# Patient Record
Sex: MALE | NOT HISPANIC OR LATINO | URBAN - NONMETROPOLITAN AREA
[De-identification: names, ages, dates, MRNs, and addresses within clinical notes are randomized per-mention and may not be internally consistent; named-entity substitution may affect disease eponyms.]

---

## 2024-02-15 ENCOUNTER — EVALUATION (OUTPATIENT)
Dept: PHYSICAL THERAPY | Facility: CLINIC | Age: 34
End: 2024-02-15
Payer: OTHER MISCELLANEOUS

## 2024-02-15 DIAGNOSIS — S33.5XXD SPRAIN OF LUMBAR REGION, SUBSEQUENT ENCOUNTER: ICD-10-CM

## 2024-02-15 PROCEDURE — 97110 THERAPEUTIC EXERCISES: CPT | Mod: GP | Performed by: PHYSICAL THERAPIST

## 2024-02-15 PROCEDURE — 97161 PT EVAL LOW COMPLEX 20 MIN: CPT | Mod: GP | Performed by: PHYSICAL THERAPIST

## 2024-02-15 ASSESSMENT — PATIENT HEALTH QUESTIONNAIRE - PHQ9
2. FEELING DOWN, DEPRESSED OR HOPELESS: NOT AT ALL
1. LITTLE INTEREST OR PLEASURE IN DOING THINGS: NOT AT ALL
SUM OF ALL RESPONSES TO PHQ9 QUESTIONS 1 AND 2: 0

## 2024-02-15 ASSESSMENT — PAIN SCALES - GENERAL: PAINLEVEL_OUTOF10: 7

## 2024-02-15 ASSESSMENT — PAIN - FUNCTIONAL ASSESSMENT: PAIN_FUNCTIONAL_ASSESSMENT: 0-10

## 2024-02-15 ASSESSMENT — ENCOUNTER SYMPTOMS
DEPRESSION: 0
OCCASIONAL FEELINGS OF UNSTEADINESS: 0
LOSS OF SENSATION IN FEET: 0

## 2024-02-15 NOTE — PROGRESS NOTES
Physical Therapy Evaluation and Treatment      Patient Name: Mekhi Erazo  MRN: [id number]  Today's Date: 2/15/2024  Time Calculation  Start Time: 1505  Stop Time: 1545  Time Calculation (min): 40 min    PT Evaluation Time Entry  PT Evaluation (Low) Time Entry: 29   PT Therapeutic Procedures Time Entry  Therapeutic Exercise Time Entry: 10                         Assessment:  PT Assessment Results: Decreased strength, Decreased range of motion, Decreased mobility, Pain  Rehab Prognosis: Good  Barriers to Participation:  (None)    The pt presents with Medical Dx of  Lumbar Sprain.   Pt presents with the following deficits: increased pain, decreased strength, ROM, flexibility and functional ability to lift/carry objects.  Pt would benefit from PT services in order to improve on these deficits and maximize strength and ability for functional activity/mobility.        Plan:  Treatment/Interventions: Aquatic therapy, Cryotherapy, Dry needling, Education/ Instruction, Electrical stimulation, Hot pack, Manual therapy, Mechanical traction, Therapeutic exercises, Ultrasound (Manual Therapy including IASTM and cupping as needed.)  PT Plan: Skilled PT  PT Frequency:  (2x/wk for 5 weeks or 10 sessions)  Rehab Potential: Good  Plan of Care Agreement: Patient (Patient Goal:  Improve pain and improve strength and get back to work)    Current Problem:   Problem List Items Addressed This Visit             ICD-10-CM       Other    Sprain of lumbar region, subsequent encounter S33.5XXD    Relevant Orders    Follow Up In Physical Therapy       Subjective   Pt reports that he injured his Low back on 11/8/2023 lifting a tote at work.  Pt reports that the pain in the low back mainly but does radiate down his L leg to his knee mostly on the outside of his leg.  Pt reports that the pain is worse with standing activity or lifting objects.  Pt reports that sitting in a comfortable position, medication and heat or ice helps a little with the  pain.      General  Reason for Referral: low back issue  Referred By: Tony Hoover DO  General Comment: Visit # 1/10    Precautions:  Precautions  Precautions Comment: No Fall Risk.   PMH:  No significant PMH.  Precautions/Restrictions:  No lifting, pushing or pulling > 10#    Pain:  Pain Assessment  Pain Assessment: 0-10  Pain Score: 7 (Pain range  5-9/10)  Pain Type: Chronic pain  Pain Location: Back  Pain Orientation: Lower    Home Living:   Pt lives alone  in a 1 story home.      Prior Level of Function:   Pt was I with all ADL's and IADL's prior.  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          Objective   Observation:  FHP with rounded shoulders.      Palpation:  Tender L3-5 levels and R  paraspinals.      Sensation:  Intact to light touch b/l LE's.                                      Myotomes/Strength:      R-   Hip Flex   5/5   Knee flex   5/5   Knee ext   5/5   DF   5/5   PF   5/5        L-   Hip Flex   4/5   Knee flex   4/5   Knee ext   4/5   DF   5/5   PF   5/5                                                           Lumbar AROM:      R-   Flex   Taylor  35%   Ext   Taylor 75%    SB   Taylor   35%       L-     SB   Taylor   35%                                                          Special Tests:  R Slump Test  (+)                           L Slump Test  (-)                      R Piriformis Test  (+)                      L Piriformis Test  (-)    Flexion/Extension Biased:  Pt has pain in flexion and extension.      Outcome Measures:  Other Measures  Oswestry Disablity Index (KAREN): 38%     Treatments:  Ther Ex:   10 min  1 unit  Hooklying SKTC  5 sec hold x 5 reps ea  Hooklying Piriformis/Glut Stretch  20 sec hold x 2 ea  Hooklying Lateral Trunk Rotation  (small comfortable arc of motion)   2 min  HEP instruction with handout given    OP EDUCATION:   PT edu pt regarding PT POC/course of treatment and HEP.  Pt was given exercise handout as a reference.  Pt verbalized good  understanding.  Access Code:  [email or web address]    Goals:  Active       PT Problem       PT Goal 1       Start:  02/15/24    Expected End:  24       LTG's:    1) Improve Lumbar AROM from  Taylor 75% Ext and 35% flex and b/l SB  to Taylor <= 25% in all planes of deficit in order to facilitate improved gait and mobility.       4-6 weeks      2) Improve Core and  L LE strength from 4/5  to >= 5-/5 throughout in order to facilitate safe gait and mobility.         4-6 weeks      3) Improve Modified Oswestry score by >= 5 points in order to improve QOL.     4-6 weeks      4) Improve LBP from  9/10 to <= 3 /10 with activity in order to improve QOL.      4-6 weeks      5) Pt will be able to walk longer distances, lift/carry objects and perform standing activity in the home and on the job without significant limitation       4-6 weeks      ST)  Pt/caregiver will be I and consistent with HEP in order to maximize strength and flexibility.    2-3 weeks

## 2024-02-15 NOTE — Clinical Note
February 15, 2024    Tony Hoover DO    Patient: Mekhi Erazo   YOB: 1990   Date of Visit: 2/15/2024       Dear No referring provider defined for this encounter.    The attached plan of care is being sent to you because your patient’s medical reimbursement requires that you certify the plan of care. Your signature is required to allow uninterrupted insurance coverage.      You may indicate your approval by signing below and faxing this form back to us at Dept Fax: [phone].    Please call Dept: [phone] with any questions or concerns.    Thank you for this referral,        Judd Ratliff, PT  [address] [address]    Payer: Payor: [employer] / Plan: [employer] / Product Type: *No Product type* /                                                                         Date:     Dear Judd Ratliff PT,     Re: Mr. Mekhi Erazo, MRN:[id number]    I certify that I have reviewed the attached plan of care and it is medically necessary for Mr. Mekhi Erazo (1990) who is under my care.          ______________________________________                    _________________  Provider name and credentials                                           Date and time                                                                                           Plan of Care 2/15/24   Effective from: 2/15/2024  Effective to: 4/5/2024    Plan ID: [id number]            Participants as of Finalize on 2/15/2024    Name Type Comments Contact Info    Tony Hoover DO Consulting Physician      Judd Ratliff, PT Physical Therapist  [phone]       Last Plan Note     Author: Judd Ratliff PT Status: Signed Last edited: 2/15/2024  3:00 PM       Physical Therapy Evaluation and Treatment      Patient Name: Mekhi Erazo  MRN: [id number]  Today's Date: 2/15/2024  Time Calculation  Start Time: 1505  Stop Time: 1545  Time  Calculation (min): 40 min    PT Evaluation Time Entry  PT Evaluation (Low) Time Entry: 29   PT Therapeutic Procedures Time Entry  Therapeutic Exercise Time Entry: 10                         Assessment:  PT Assessment Results: Decreased strength, Decreased range of motion, Decreased mobility, Pain  Rehab Prognosis: Good  Barriers to Participation:  (None)    The pt presents with Medical Dx of  Lumbar Sprain.   Pt presents with the following deficits: increased pain, decreased strength, ROM, flexibility and functional ability to lift/carry objects.  Pt would benefit from PT services in order to improve on these deficits and maximize strength and ability for functional activity/mobility.        Plan:  Treatment/Interventions: Aquatic therapy, Cryotherapy, Dry needling, Education/ Instruction, Electrical stimulation, Hot pack, Manual therapy, Mechanical traction, Therapeutic exercises, Ultrasound (Manual Therapy including IASTM and cupping as needed.)  PT Plan: Skilled PT  PT Frequency:  (2x/wk for 5 weeks or 10 sessions)  Rehab Potential: Good  Plan of Care Agreement: Patient (Patient Goal:  Improve pain and improve strength and get back to work)    Current Problem:   Problem List Items Addressed This Visit             ICD-10-CM       Other    Sprain of lumbar region, subsequent encounter S33.5XXD    Relevant Orders    Follow Up In Physical Therapy       Subjective   Pt reports that he injured his Low back on 11/8/2023 lifting a tote at work.  Pt reports that the pain in the low back mainly but does radiate down his L leg to his knee mostly on the outside of his leg.  Pt reports that the pain is worse with standing activity or lifting objects.  Pt reports that sitting in a comfortable position, medication and heat or ice helps a little with the pain.      General  Reason for Referral: low back issue  Referred By: Tony Hoover DO  General Comment: Visit # 1/10    Precautions:  Precautions  Precautions Comment: No Fall  Risk.   PMH:  No significant PMH.  Precautions/Restrictions:  No lifting, pushing or pulling > 10#    Pain:  Pain Assessment  Pain Assessment: 0-10  Pain Score: 7 (Pain range  5-9/10)  Pain Type: Chronic pain  Pain Location: Back  Pain Orientation: Lower    Home Living:   Pt lives alone  in a 1 story home.      Prior Level of Function:   Pt was I with all ADL's and IADL's prior.  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          Objective   Observation:  FHP with rounded shoulders.      Palpation:  Tender L3-5 levels and R  paraspinals.      Sensation:  Intact to light touch b/l LE's.                                      Myotomes/Strength:      R-   Hip Flex   5/5   Knee flex   5/5   Knee ext   5/5   DF   5/5   PF   5/5        L-   Hip Flex   4/5   Knee flex   4/5   Knee ext   4/5   DF   5/5   PF   5/5                                                           Lumbar AROM:      R-   Flex   Taylor  35%   Ext   Taylor 75%    SB   Taylor   35%       L-     SB   Taylor   35%                                                          Special Tests:  R Slump Test  (+)                           L Slump Test  (-)                      R Piriformis Test  (+)                      L Piriformis Test  (-)    Flexion/Extension Biased:  Pt has pain in flexion and extension.      Outcome Measures:  Other Measures  Oswestry Disablity Index (KAREN): 38%     Treatments:  Ther Ex:   10 min  1 unit  Hooklying SKTC  5 sec hold x 5 reps ea  Hooklying Piriformis/Glut Stretch  20 sec hold x 2 ea  Hooklying Lateral Trunk Rotation  (small comfortable arc of motion)   2 min  HEP instruction with handout given    OP EDUCATION:   PT edu pt regarding PT POC/course of treatment and HEP.  Pt was given exercise handout as a reference.  Pt verbalized good understanding.  Access Code:  [email or web address]    Goals:  Active       PT Problem       PT Goal 1       Start:  02/15/24    Expected End:  04/12/24       LTG's:    1) Improve Lumbar AROM from  Taylor 75% Ext  and 35% flex and b/l SB  to Taylor <= 25% in all planes of deficit in order to facilitate improved gait and mobility.       4-6 weeks      2) Improve Core and  L LE strength from 4/5  to >= 5-/5 throughout in order to facilitate safe gait and mobility.         4-6 weeks      3) Improve Modified Oswestry score by >= 5 points in order to improve QOL.     4-6 weeks      4) Improve LBP from  9/10 to <= 3 /10 with activity in order to improve QOL.      4-6 weeks      5) Pt will be able to walk longer distances, lift/carry objects and perform standing activity in the home and on the job without significant limitation       4-6 weeks      ST)  Pt/caregiver will be I and consistent with HEP in order to maximize strength and flexibility.    2-3 weeks                              Current Participants as of 2/15/2024    Name Type Comments Contact Info    Tony Hoover DO Consulting Physician      Signature pending    Judd Ratliff, PT Physical Therapist  [phone]

## 2024-02-19 ENCOUNTER — TREATMENT (OUTPATIENT)
Dept: PHYSICAL THERAPY | Facility: CLINIC | Age: 34
End: 2024-02-19
Payer: OTHER MISCELLANEOUS

## 2024-02-19 DIAGNOSIS — S33.5XXD SPRAIN OF LUMBAR REGION, SUBSEQUENT ENCOUNTER: ICD-10-CM

## 2024-02-19 PROCEDURE — 97010 HOT OR COLD PACKS THERAPY: CPT | Mod: GP | Performed by: PHYSICAL THERAPIST

## 2024-02-19 PROCEDURE — 97110 THERAPEUTIC EXERCISES: CPT | Mod: GP | Performed by: PHYSICAL THERAPIST

## 2024-02-19 PROCEDURE — 97014 ELECTRIC STIMULATION THERAPY: CPT | Mod: GP | Performed by: PHYSICAL THERAPIST

## 2024-02-19 ASSESSMENT — PAIN - FUNCTIONAL ASSESSMENT: PAIN_FUNCTIONAL_ASSESSMENT: 0-10

## 2024-02-19 ASSESSMENT — PAIN SCALES - GENERAL: PAINLEVEL_OUTOF10: 7

## 2024-02-19 NOTE — PROGRESS NOTES
Physical Therapy Treatment    Patient Name: Mekhi Erazo  MRN: [id number]  Today's Date: 2/19/2024  Time Calculation  Start Time: 1315  Stop Time: 1355  Time Calculation (min): 40 min      PT Therapeutic Procedures Time Entry  Therapeutic Exercise Time Entry: 20   PT Modalities Time Entry  E-Stim (Unattended) Time Entry: 10  Hot/Cold Pack Time Entry: 10                     General  Reason for Referral: low back issue  Referred By: Tony Hoover DO  General Comment: Visit # 7/10    Assessment:  PT only had pt perform stretches today due to severe pain and added E-stim with moist heat for pain today.  Pt reports that the heat and e-stim felt good on back but did not take pain away but did lessen it slightly.      Plan:  OP PT Plan  Treatment/Interventions: Aquatic therapy, Cryotherapy, Dry needling, Education/ Instruction, Electrical stimulation, Hot pack, Manual therapy, Mechanical traction, Therapeutic exercises, Ultrasound (Manual Therapy including IASTM and cupping as needed.)  PT Plan: Skilled PT  PT Frequency:  (2x/wk for 5 weeks or 10 sessions)  Rehab Potential: Good  Plan of Care Agreement: Patient (Patient Goal:  Improve pain and improve strength and get back to work)    Continue to progress with low back, hip/LE and core strength and ROM/flexibility in order to be able to work around the home or on the job without severe pain/limitation.    Current Problem  Problem List Items Addressed This Visit             ICD-10-CM       Other    Sprain of lumbar region, subsequent encounter S33.5XXD       Subjective  Pt reports that his Lower back is pretty sore and painful today with standing activity.      Precautions  Precautions  Precautions Comment: No Fall Risk.   PMH:  No significant PMH.  Precautions/Restrictions:  No lifting, pushing or pulling > 10#    Pain  Pain Assessment: 0-10  Pain Score: 7  Pain Type: Chronic pain  Pain Location: Back  Pain Orientation: Lower, Right      Treatments:  Ther Ex:   20 min  1  unit  Hooklying SKTC  5 sec hold x 5 reps ea  Hooklying Piriformis/Glut Stretch  20 sec hold x 2 ea  Hooklying Lateral Trunk Rotation  (small comfortable arc of motion)   2 min    Modalities:  10 min  1 unit  IFC E-stim with moist heat to low back sitting in chair.  10 min      OP EDUCATION:   Access Code:  [email or web address]  Edu on proper form and speed of movement with ex's.  Pt verbalized/demonstrated good understanding.         Goals:  Active       PT Problem       PT Goal 1       Start:  02/15/24    Expected End:  24       LTG's:    1) Improve Lumbar AROM from  Taylor 75% Ext and 35% flex and b/l SB  to Taylor <= 25% in all planes of deficit in order to facilitate improved gait and mobility.       4-6 weeks      2) Improve Core and  L LE strength from 4/5  to >= 5-/5 throughout in order to facilitate safe gait and mobility.         4-6 weeks      3) Improve Modified Oswestry score by >= 5 points in order to improve QOL.     4-6 weeks      4) Improve LBP from  9/10 to <= 3 /10 with activity in order to improve QOL.      4-6 weeks      5) Pt will be able to walk longer distances, lift/carry objects and perform standing activity in the home and on the job without significant limitation       4-6 weeks      ST)  Pt/caregiver will be I and consistent with HEP in order to maximize strength and flexibility.    2-3 weeks

## 2024-02-27 ENCOUNTER — DOCUMENTATION (OUTPATIENT)
Dept: PHYSICAL THERAPY | Facility: CLINIC | Age: 34
End: 2024-02-27
Payer: OTHER MISCELLANEOUS

## 2024-02-27 NOTE — PROGRESS NOTES
Physical Therapy                 Therapy Communication Note    Patient Name: Mekhi Erazo  MRN: [id number]  Today's Date: 2/27/2024     Discipline: Physical Therapy    Missed Visit Reason:      Missed Time: No Show    Comment:  Called patient and he stated that he has been very painful today and could not come into appointment. Gave patient next appointment date and time. ANDREW

## 2024-03-01 ENCOUNTER — TREATMENT (OUTPATIENT)
Dept: PHYSICAL THERAPY | Facility: CLINIC | Age: 34
End: 2024-03-01
Payer: OTHER MISCELLANEOUS

## 2024-03-01 DIAGNOSIS — S33.5XXD SPRAIN OF LUMBAR REGION, SUBSEQUENT ENCOUNTER: ICD-10-CM

## 2024-03-01 PROCEDURE — 97010 HOT OR COLD PACKS THERAPY: CPT | Mod: GP,CQ

## 2024-03-01 PROCEDURE — 97110 THERAPEUTIC EXERCISES: CPT | Mod: GP,CQ

## 2024-03-01 PROCEDURE — 97014 ELECTRIC STIMULATION THERAPY: CPT | Mod: GP,CQ

## 2024-03-01 ASSESSMENT — PAIN - FUNCTIONAL ASSESSMENT: PAIN_FUNCTIONAL_ASSESSMENT: 0-10

## 2024-03-01 ASSESSMENT — PAIN DESCRIPTION - DESCRIPTORS: DESCRIPTORS: SHARP;ACHING;BURNING;DISCOMFORT

## 2024-03-01 ASSESSMENT — PAIN SCALES - GENERAL: PAINLEVEL_OUTOF10: 7

## 2024-03-01 NOTE — PROGRESS NOTES
Physical Therapy Treatment    Patient Name: Mekhi Erazo  MRN: [id number]  Today's Date: 3/1/2024  Time Calculation  Start Time: 0915  Stop Time: 0956  Time Calculation (min): 41 min        Current Problem  Problem List Items Addressed This Visit             ICD-10-CM    Sprain of lumbar region, subsequent encounter S33.5XXD       Subjective   Pt. C/o 7/10 pain with low back.  Pain radiates down into right hip and leg  (down to the knee).  Pt. States he got about 4-5 hours of rest last night.    Reason for Referral: low back issue  Referred By: Tony Hoover DO  General Comment: Visit # 8/10      Precautions  Precautions  Precautions Comment: No Fall Risk.   PMH:  No significant PMH.  Precautions/Restrictions:  No lifting, pushing or pulling > 10#      Pain  Pain Assessment: 0-10  Pain Score: 7  Pain Type: Chronic pain  Pain Location: Back  Pain Orientation: Lower  Pain Radiating Towards: right hip and knee  Pain Descriptors: Sharp, Aching, Burning, Discomfort      Treatments:   Ther Ex:   20 min  1 unit  Hooklying SKTC  5 sec hold x 5 reps ea  Hooklying Piriformis/Glut Stretch  20 sec hold x 2 ea  Hooklying Lateral Trunk Rotation  (small comfortable arc of motion)   2 min     Modalities:  15 min  1 unit  IFC E-stim with moist heat to low back sitting in chair.  10 min     -3/1: supine with wedge under the legs x15 mins        Assessment: Pt. Challenged with stretches which he moves slowly through them.  Tightness/pain greater on the right side.  Trial TrA sets which patient states he can feel.  Decrease in sx.'s following e-stim/MHP, 4/10.  Instructed patient on log roll when getting up from the table, pt. States that was easier to get up.  Handout provided and reviewed with patient.       Plan: Continue to progress with low back, hip/LE and core strength and ROM/flexibility in order to be able to work around the home or on the job without severe pain/limitation.     OP PT Plan  Treatment/Interventions: Aquatic  therapy, Cryotherapy, Dry needling, Education/ Instruction, Electrical stimulation, Hot pack, Manual therapy, Mechanical traction, Therapeutic exercises, Ultrasound (Manual Therapy including IASTM and cupping as needed.)  PT Plan: Skilled PT  PT Frequency:  (2x/wk for 5 weeks or 10 sessions)  Rehab Potential: Good  Plan of Care Agreement: Patient (Patient Goal:  Improve pain and improve strength and get back to work)      OP EDUCATION:  Access Code: [email or web address]  URL: [email or web address]  Date: 2024  Prepared by: Stephany Ambriz    Exercises  - Supine Transversus Abdominis Bracing - Hands on Stomach  - 1 x daily - 7 x weekly - 2 sets - 10 reps    Access Code:  [email or web address]  Edu on proper form and speed of movement with ex's.  Pt verbalized/demonstrated good understanding.      Goals:  Active       PT Problem       PT Goal 1       Start:  02/15/24    Expected End:  24       LTG's:    1) Improve Lumbar AROM from  Taylor 75% Ext and 35% flex and b/l SB  to Taylor <= 25% in all planes of deficit in order to facilitate improved gait and mobility.       4-6 weeks      2) Improve Core and  L LE strength from 4/5  to >= 5-/5 throughout in order to facilitate safe gait and mobility.         4-6 weeks      3) Improve Modified Oswestry score by >= 5 points in order to improve QOL.     4-6 weeks      4) Improve LBP from  9/10 to <= 3 /10 with activity in order to improve QOL.      4-6 weeks      5) Pt will be able to walk longer distances, lift/carry objects and perform standing activity in the home and on the job without significant limitation       4-6 weeks      ST)  Pt/caregiver will be I and consistent with HEP in order to maximize strength and flexibility.    2-3 weeks

## 2024-03-04 ENCOUNTER — TREATMENT (OUTPATIENT)
Dept: PHYSICAL THERAPY | Facility: CLINIC | Age: 34
End: 2024-03-04
Payer: OTHER MISCELLANEOUS

## 2024-03-04 DIAGNOSIS — S33.5XXD SPRAIN OF LUMBAR REGION, SUBSEQUENT ENCOUNTER: ICD-10-CM

## 2024-03-04 PROCEDURE — 97014 ELECTRIC STIMULATION THERAPY: CPT | Mod: GP,CQ

## 2024-03-04 PROCEDURE — 97110 THERAPEUTIC EXERCISES: CPT | Mod: GP,CQ

## 2024-03-04 ASSESSMENT — PAIN SCALES - GENERAL: PAINLEVEL_OUTOF10: 6

## 2024-03-04 ASSESSMENT — PAIN DESCRIPTION - DESCRIPTORS: DESCRIPTORS: STABBING

## 2024-03-04 ASSESSMENT — PAIN - FUNCTIONAL ASSESSMENT: PAIN_FUNCTIONAL_ASSESSMENT: 0-10

## 2024-03-04 NOTE — PROGRESS NOTES
Physical Therapy Treatment    Patient Name: Mekhi Erazo  MRN: [id number]  Today's Date: 3/4/2024  Time Calculation  Start Time: 1003  Stop Time: 1110  Time Calculation (min): 67 min  PT Therapeutic Procedures Time Entry  Therapeutic Exercise Time Entry: 35  PT Modalities Time Entry  E-Stim (Unattended) Time Entry: 20      Assessment:  Patient identified by name & . Treatment consisted of extension positioning progression. Patients pain decreased from 6/10 to 0/10 when he was lying in prone position. His pain increased to 3/10 after completing prone press up. Patient explained in depth on progression of the extension positioning and what signs/symptoms to be aware of when doing this. Patient instructed on how to get on off bed to perform this and to avoid flexion when transferring. Patient to hold SKTC and LTR for now to avoid flexion. Patient given handouts for extension positioning progression and for R side glides.  Instructed on correct body mechanics to avoid flexion. Low back pain decreased post treatment.     Plan:  Continue with extension bias positioning if it continues to centralize and decrease his low back pain. Initiated core strengthening when able for improved tolerance to upright position for ADL's and household tasks. -MA   OP PT Plan  Treatment/Interventions: Aquatic therapy, Cryotherapy, Dry needling, Education/ Instruction, Electrical stimulation, Hot pack, Manual therapy, Mechanical traction, Therapeutic exercises, Ultrasound (Manual Therapy including IASTM and cupping as needed.)  PT Plan: Skilled PT  PT Frequency:  (2x/wk for 5 weeks or 10 sessions)  Rehab Potential: Good  Plan of Care Agreement: Patient (Patient Goal:  Improve pain and improve strength and get back to work)    Current Problem  Problem List Items Addressed This Visit             ICD-10-CM    Sprain of lumbar region, subsequent encounter S33.5XXD       Subjective   Pain in lower back and no radiating symptoms today. Patient  states that sometimes he also has a burning sensation. Pain decreased from 6/10 to 4/10 post treatment.   General  Reason for Referral: low back issue  Referred By: Tony Hoover DO  General Comment: Visit # 4/10  Precautions  Precautions  Precautions Comment: No Fall Risk.   PMH:  No significant PMH.  Precautions/Restrictions:  No lifting, pushing or pulling > 10#    Pain  Pain Assessment: 0-10  Pain Score: 6  Pain Type: Chronic pain  Pain Location: Back  Pain Orientation: Lower  Pain Descriptors: Stabbing      Treatments:  Ther Ex:   x35'   Prone lying x1 min  Prone lying on low elbows x1'  Prone lying on high elbows x1'   Prone press ups, 2x5  Hooklying SKTC  5 sec hold x 5 reps ea (hold)   Hooklying Piriformis/Glut Stretch  20 sec hold x 2 ea  Hooklying Lateral Trunk Rotation  (small comfortable arc of motion)   2 min (hold)   Modalities:  20 min  1 unit  IFC E-stim with moist heat to low back in supine with small bolster under knees to keep back in neutral position.    -3/1: supine with wedge under the legs x15 mins    OP EDUCATION:   PT edu pt regarding PT POC/course of treatment and HEP.  Pt was given exercise handout as a reference.  Pt verbalized good understanding.  Access Code:  [email or web address]    Access Code: [email or web address]  URL: [email or web address]  Date: 03/04/2024  Prepared by: Eli Edge  Exercises  - Lying Prone  - 1 x daily - 7 x weekly - 1 sets - 1 reps  - Static Prone on Elbows  - 1 x daily - 7 x weekly - 1 sets - 1 reps  - Prone Press Up  - 1 x daily - 7 x weekly - 1 sets - 5-10 reps  - Lateral Shift Correction at Wall  - 1 x daily - 7 x weekly - 1 sets - 5-10 reps  Goals:  Active       PT Problem       PT Goal 1       Start:  02/15/24    Expected End:  04/12/24       LTG's:    1) Improve Lumbar AROM from  Taylor 75% Ext and 35% flex and b/l SB  to Taylor <= 25% in all planes of deficit in order to facilitate improved gait and mobility.       4-6 weeks      2) Improve Core and  L LE  strength from 4/5  to >= 5-/5 throughout in order to facilitate safe gait and mobility.         4-6 weeks      3) Improve Modified Oswestry score by >= 5 points in order to improve QOL.     4-6 weeks      4) Improve LBP from  9/10 to <= 3 /10 with activity in order to improve QOL.      4-6 weeks      5) Pt will be able to walk longer distances, lift/carry objects and perform standing activity in the home and on the job without significant limitation       4-6 weeks      ST)  Pt/caregiver will be I and consistent with HEP in order to maximize strength and flexibility.    2-3 weeks

## 2024-03-06 ENCOUNTER — TREATMENT (OUTPATIENT)
Dept: PHYSICAL THERAPY | Facility: CLINIC | Age: 34
End: 2024-03-06
Payer: OTHER MISCELLANEOUS

## 2024-03-06 DIAGNOSIS — S33.5XXD SPRAIN OF LUMBAR REGION, SUBSEQUENT ENCOUNTER: ICD-10-CM

## 2024-03-06 PROCEDURE — 97140 MANUAL THERAPY 1/> REGIONS: CPT | Mod: GP,CQ

## 2024-03-06 PROCEDURE — 97010 HOT OR COLD PACKS THERAPY: CPT | Mod: GP,CQ

## 2024-03-06 PROCEDURE — 97014 ELECTRIC STIMULATION THERAPY: CPT | Mod: GP,CQ

## 2024-03-06 PROCEDURE — 97110 THERAPEUTIC EXERCISES: CPT | Mod: GP,CQ

## 2024-03-06 ASSESSMENT — PAIN SCALES - GENERAL: PAINLEVEL_OUTOF10: 6

## 2024-03-06 ASSESSMENT — PAIN - FUNCTIONAL ASSESSMENT: PAIN_FUNCTIONAL_ASSESSMENT: 0-10

## 2024-03-06 NOTE — PROGRESS NOTES
Physical Therapy Treatment    Patient Name: Mekhi Erazo  MRN: [id number]  Today's Date: 3/6/2024  Time Calculation  Start Time: 1004  Stop Time: 1108  Time Calculation (min): 64 min  PT Therapeutic Procedures Time Entry  Manual Therapy Time Entry: 17  Therapeutic Exercise Time Entry: 15  PT Modalities Time Entry  E-Stim (Unattended) Time Entry: 20  Hot/Cold Pack Time Entry: 20      Assessment:   Patient identified by name & . Treatment consisted of extension positioning, manual therapy and IFC/HP. Improved extension ROM today and ease of prone press up with decreased pain. Patient able to stay in prone throughout the positioning, STW and for 15' of IFC/HP. Transferred from prone to sitting the last 5 min of IFC. Patient with decreased low back pain, improved tolerance to standing and improved upright posture post treatment. Is able to bear increased weight through R LE with gait also.     Plan:  Continue with extension bias positioning and decreasing spasms in Low back and mid back with STW/Cupping and IFC for ease of ADL's. -MA   OP PT Plan  Treatment/Interventions: Aquatic therapy, Cryotherapy, Dry needling, Education/ Instruction, Electrical stimulation, Hot pack, Manual therapy, Mechanical traction, Therapeutic exercises, Ultrasound (Manual Therapy including IASTM and cupping as needed.)  PT Plan: Skilled PT  PT Frequency:  (2x/wk for 5 weeks or 10 sessions)  Rehab Potential: Good  Plan of Care Agreement: Patient (Patient Goal:  Improve pain and improve strength and get back to work)    Current Problem  Problem List Items Addressed This Visit             ICD-10-CM    Sprain of lumbar region, subsequent encounter S33.5XXD       Subjective   Reports relief from extension positioning until the evening of last treatment and then pain increased. Reports he did his extension positioning at home and he did get relief from this. Pain decreased to 4/10 post treatment.   General  Reason for Referral: low back  issue  Referred By: Tony Hoover DO  General Comment: Visit # 5/10  Precautions  Precautions  Precautions Comment: No Fall Risk.   PMH:  No significant PMH.  Precautions/Restrictions:  No lifting, pushing or pulling > 10#    Pain  Pain Assessment: 0-10  Pain Score: 6  Pain Type: Chronic pain  Pain Location: Back  Pain Orientation: Lower    Treatments:  Ther Ex: x15'  Prone lying x2 min with R lateral shift (Hips pulled to L, closing on R)   Prone lying on low elbows x2' with R lateral shift (hips pulled to L, closing on R)  Prone lying on high elbows x1'   Prone press ups, 2x5  Hooklying SKTC  5 sec hold x 5 reps ea (hold) (X)  Hooklying Piriformis/Glut Stretch  20 sec hold x 2 ea (X)  Hooklying Lateral Trunk Rotation  (small comfortable arc of motion)   2 min (hold) (X)  Standing R side glides at wall (X)   Manual Therapy x17' (N)  STW & dynamic cupping to lumbar and mid back  Modalities:  20'  IFC E-stim with moist heat to low back in prone for 20' total (Prone x15' sitting x5')    OP EDUCATION:   PT edu pt regarding PT POC/course of treatment and HEP.  Pt was given exercise handout as a reference.  Pt verbalized good understanding.  Access Code:  [email or web address]    Access Code: [email or web address]  URL: [email or web address]  Date: 03/04/2024  Prepared by: Eli Edge  Exercises  - Lying Prone  - 1 x daily - 7 x weekly - 1 sets - 1 reps  - Static Prone on Elbows  - 1 x daily - 7 x weekly - 1 sets - 1 reps  - Prone Press Up  - 1 x daily - 7 x weekly - 1 sets - 5-10 reps  - Lateral Shift Correction at Wall  - 1 x daily - 7 x weekly - 1 sets - 5-10 reps    Goals:  Active       PT Problem       PT Goal 1       Start:  02/15/24    Expected End:  04/12/24       LTG's:    1) Improve Lumbar AROM from  Taylor 75% Ext and 35% flex and b/l SB  to Taylor <= 25% in all planes of deficit in order to facilitate improved gait and mobility.       4-6 weeks      2) Improve Core and  L LE strength from 4/5  to >= 5-/5 throughout  in order to facilitate safe gait and mobility.         4-6 weeks      3) Improve Modified Oswestry score by >= 5 points in order to improve QOL.     4-6 weeks      4) Improve LBP from  9/10 to <= 3 /10 with activity in order to improve QOL.      4-6 weeks      5) Pt will be able to walk longer distances, lift/carry objects and perform standing activity in the home and on the job without significant limitation       4-6 weeks      ST)  Pt/caregiver will be I and consistent with HEP in order to maximize strength and flexibility.    2-3 weeks

## 2024-03-11 ENCOUNTER — TREATMENT (OUTPATIENT)
Dept: PHYSICAL THERAPY | Facility: CLINIC | Age: 34
End: 2024-03-11
Payer: OTHER MISCELLANEOUS

## 2024-03-11 DIAGNOSIS — S33.5XXD SPRAIN OF LUMBAR REGION, SUBSEQUENT ENCOUNTER: ICD-10-CM

## 2024-03-11 PROCEDURE — 97140 MANUAL THERAPY 1/> REGIONS: CPT | Mod: GP,CQ

## 2024-03-11 PROCEDURE — 97110 THERAPEUTIC EXERCISES: CPT | Mod: GP,CQ

## 2024-03-11 PROCEDURE — 97012 MECHANICAL TRACTION THERAPY: CPT | Mod: GP,CQ

## 2024-03-11 ASSESSMENT — PAIN SCALES - GENERAL: PAINLEVEL_OUTOF10: 6

## 2024-03-11 ASSESSMENT — PAIN DESCRIPTION - DESCRIPTORS: DESCRIPTORS: SHARP;SQUEEZING

## 2024-03-11 ASSESSMENT — PAIN - FUNCTIONAL ASSESSMENT: PAIN_FUNCTIONAL_ASSESSMENT: 0-10

## 2024-03-11 NOTE — PROGRESS NOTES
"Physical Therapy Treatment    Patient Name: Mekhi Erazo  MRN: [id number]  Today's Date: 3/11/2024  Time Calculation  Start Time: 1000  Stop Time: 1105  Time Calculation (min): 65 min  PT Therapeutic Procedures Time Entry  Manual Therapy Time Entry: 15  Therapeutic Exercise Time Entry: 20  PT Modalities Time Entry  Mechanical Traction Time Entry: 15      Assessment:  Patient identified by name & . Treatment consisted of ex's, STW/cupping and initiated mechanical lumbar traction. Good pain relief from traction today. Continues to ambulate with decreased weight on R LE.     Plan:  Continue with ext bias positioning and ex's, manual therapy and mechanical traction for improved ease of standing upright, improving weight bearing on R LE and ease of gait and ADL's.   -MA  Current Problem  Problem List Items Addressed This Visit             ICD-10-CM    Sprain of lumbar region, subsequent encounter S33.5XXD       Subjective   Feels pain more on R side than left side. Reports relief from treatments and at home with prone positioning and heating pad, but the pain returns. Reports that he is using towel roll at arch of his back and watching his body mechanics to avoid flexion. Pain decreased to 4-5/10 post treatment.    General  Reason for Referral: low back issue  Referred By: Tony Hoover DO  General Comment: Visit # 6/10  Precautions  Precautions  Precautions Comment: No Fall Risk.   PMH:  No significant PMH.  Precautions/Restrictions:  No lifting, pushing or pulling > 10#    Pain  Pain Assessment: 0-10  Pain Score: 6  Pain Type: Chronic pain  Pain Location: Back  Pain Orientation: Lower  Pain Descriptors: Sharp, Squeezing    Treatments:  Ther Ex: x20'  Prone lying x2 min   Prone lying on low elbows x2' (X)  Prone lying on high elbows x2' (P hold time)  Prone press ups, 2x5  (Cupping done after above ex's)  Supine Piriformis stretch (pulling knee to opposite shoulder with lumbar spine in neutral 20\" x2 each   Standing R " "side glides at wall 2x5   BELOW NOT DONE:   Hooklying SKTC  5 sec hold x 5 reps ea (hold) (X)  Hooklying Piriformis/Glut Stretch  20 sec hold x 2 ea (X)  Hooklying Lateral Trunk Rotation  (small comfortable arc of motion)   2 min (hold) (X)    Manual Therapy x15'   STW & dynamic cupping to lumbar and mid back  Modalities:  15'  - IFC E-stim with moist heat to low back in prone for 20' total (Prone x15' sitting x5') (X)   -  lumbar traction (N)  Intermittent: 100/75 #, Hold 60\" /rest 20\"   4 static steps to get to 100#     OP EDUCATION:   PT edu pt regarding PT POC/course of treatment and HEP.  Pt was given exercise handout as a reference.  Pt verbalized good understanding.  Access Code:  [email or web address]    Access Code: [email or web address]  URL: [email or web address]  Date: 03/04/2024  Prepared by: Eli Edge  Exercises  - Lying Prone  - 1 x daily - 7 x weekly - 1 sets - 1 reps  - Static Prone on Elbows  - 1 x daily - 7 x weekly - 1 sets - 1 reps  - Prone Press Up  - 1 x daily - 7 x weekly - 1 sets - 5-10 reps  - Lateral Shift Correction at Wall  - 1 x daily - 7 x weekly - 1 sets - 5-10 reps  3/11/24 Instructed in log roll and reviewed body mechanics to avoid flexion.       Goals:  Active       PT Problem       PT Goal 1       Start:  02/15/24    Expected End:  04/12/24       LTG's:    1) Improve Lumbar AROM from  Taylor 75% Ext and 35% flex and b/l SB  to Taylor <= 25% in all planes of deficit in order to facilitate improved gait and mobility.       4-6 weeks      2) Improve Core and  L LE strength from 4/5  to >= 5-/5 throughout in order to facilitate safe gait and mobility.         4-6 weeks      3) Improve Modified Oswestry score by >= 5 points in order to improve QOL.     4-6 weeks      4) Improve LBP from  9/10 to <= 3 /10 with activity in order to improve QOL.      4-6 weeks      5) Pt will be able to walk longer distances, lift/carry objects and perform standing activity in the home and on the job " without significant limitation       4-6 weeks      ST)  Pt/caregiver will be I and consistent with HEP in order to maximize strength and flexibility.    2-3 weeks

## 2024-03-13 ENCOUNTER — TREATMENT (OUTPATIENT)
Dept: PHYSICAL THERAPY | Facility: CLINIC | Age: 34
End: 2024-03-13
Payer: OTHER MISCELLANEOUS

## 2024-03-13 DIAGNOSIS — S33.5XXD SPRAIN OF LUMBAR REGION, SUBSEQUENT ENCOUNTER: ICD-10-CM

## 2024-03-13 PROCEDURE — 97012 MECHANICAL TRACTION THERAPY: CPT | Mod: GP,CQ

## 2024-03-13 PROCEDURE — 97140 MANUAL THERAPY 1/> REGIONS: CPT | Mod: GP,CQ

## 2024-03-13 ASSESSMENT — PAIN SCALES - GENERAL: PAINLEVEL_OUTOF10: 5 - MODERATE PAIN

## 2024-03-13 ASSESSMENT — PAIN - FUNCTIONAL ASSESSMENT: PAIN_FUNCTIONAL_ASSESSMENT: 0-10

## 2024-03-13 NOTE — PROGRESS NOTES
Physical Therapy Treatment    Patient Name: Mekhi Erazo  MRN: [id number]  Today's Date: 3/13/2024  Time Calculation  Start Time: 1002  Stop Time: 1050  Time Calculation (min): 48 min  PT Therapeutic Procedures Time Entry  Manual Therapy Time Entry: 25  PT Modalities Time Entry  Mechanical Traction Time Entry: 15      Assessment:   Patient identified by name & . Treatment consisted of manual therapy and lumbar mechanical traction. Progressed weight of pull and hold time of traction. Patient with good results of pain relief from traction.     Plan:  Continue with manual therapy and mechanical traction for pain relief. When patient can tolerate, initiated gentle core strengthening and stretches for improved ease of ADL's. -MA   OP PT Plan  Treatment/Interventions: Aquatic therapy, Cryotherapy, Dry needling, Education/ Instruction, Electrical stimulation, Hot pack, Manual therapy, Mechanical traction, Therapeutic exercises, Ultrasound (Manual Therapy including IASTM and cupping as needed.)  PT Plan: Skilled PT  PT Frequency:  (2x/wk for 5 weeks or 10 sessions)  Rehab Potential: Good  Plan of Care Agreement: Patient (Patient Goal:  Improve pain and improve strength and get back to work)    Current Problem  Problem List Items Addressed This Visit             ICD-10-CM    Sprain of lumbar region, subsequent encounter S33.5XXD       Subjective   States he has more of an antalgic gait due to  right knee pain. States he has been trying to put more weight on R LE and due to past knee problems, he feels that his knee is irritated. States he had approximately 1.5- 2 hours of pain relief after last treatment. Pain decreased to 3-4/10 post treatment. Patient compliant with HEP.   General  Reason for Referral: low back issue  Referred By: Tony Hoover DO  General Comment: Visit # 7/10  Precautions  Precautions  Precautions Comment: No Fall Risk.   PMH:  No significant PMH.  Precautions/Restrictions:  No lifting, pushing or  "pulling > 10#    Pain  Pain Assessment: 0-10  Pain Score: 5 - Moderate pain  Pain Type: Chronic pain  Pain Location: Back  Pain Orientation: Lower    Treatments:  Ther Ex: (X, not today)   Prone lying x2 min (X)  Prone lying on low elbows x2' (X)  Prone lying on high elbows x2' (P hold time)  Prone press ups, 2x5  Supine Piriformis stretch (pulling knee to opposite shoulder with lumbar spine in neutral 20\" x2 each   Standing R side glides at wall 2x5   BELOW NOT DONE:   Hooklying SKTC  5 sec hold x 5 reps ea (hold) (X)  Hooklying Piriformis/Glut Stretch  20 sec hold x 2 ea (X)  Hooklying Lateral Trunk Rotation  (small comfortable arc of motion)   2 min (hold) (X)    Manual Therapy x25'   STW & dynamic & static cupping to lumbar and mid back in prone  Modalities:  15'  - IFC E-stim with moist heat to low back in prone for 20' total (Prone x15' sitting x5') (X)   -  lumbar traction (N)  Intermittent: 125/75 #, Hold 75\" /rest 20\"   4 static steps to get to 125#     OP EDUCATION:   PT edu pt regarding PT POC/course of treatment and HEP.  Pt was given exercise handout as a reference.  Pt verbalized good understanding.  Access Code:  [email or web address]    Access Code: [email or web address]  URL: [email or web address]  Date: 03/04/2024  Prepared by: Eli Edge  Exercises  - Lying Prone  - 1 x daily - 7 x weekly - 1 sets - 1 reps  - Static Prone on Elbows  - 1 x daily - 7 x weekly - 1 sets - 1 reps  - Prone Press Up  - 1 x daily - 7 x weekly - 1 sets - 5-10 reps  - Lateral Shift Correction at Wall  - 1 x daily - 7 x weekly - 1 sets - 5-10 reps  3/11/24 Instructed in log roll and reviewed body mechanics to avoid flexion.    Goals:  Active       PT Problem       PT Goal 1       Start:  02/15/24    Expected End:  04/12/24       LTG's:    1) Improve Lumbar AROM from  Taylor 75% Ext and 35% flex and b/l SB  to Taylor <= 25% in all planes of deficit in order to facilitate improved gait and mobility.       4-6 weeks      2) " Improve Core and  L LE strength from 4/5  to >= 5-/5 throughout in order to facilitate safe gait and mobility.         4-6 weeks      3) Improve Modified Oswestry score by >= 5 points in order to improve QOL.     4-6 weeks      4) Improve LBP from  9/10 to <= 3 /10 with activity in order to improve QOL.      4-6 weeks      5) Pt will be able to walk longer distances, lift/carry objects and perform standing activity in the home and on the job without significant limitation       4-6 weeks      ST)  Pt/caregiver will be I and consistent with HEP in order to maximize strength and flexibility.    2-3 weeks

## 2024-03-18 ENCOUNTER — TREATMENT (OUTPATIENT)
Dept: PHYSICAL THERAPY | Facility: CLINIC | Age: 34
End: 2024-03-18
Payer: OTHER MISCELLANEOUS

## 2024-03-18 DIAGNOSIS — S33.5XXD SPRAIN OF LUMBAR REGION, SUBSEQUENT ENCOUNTER: ICD-10-CM

## 2024-03-18 PROCEDURE — 97140 MANUAL THERAPY 1/> REGIONS: CPT | Mod: GP,CQ

## 2024-03-18 PROCEDURE — 97012 MECHANICAL TRACTION THERAPY: CPT | Mod: GP,CQ

## 2024-03-18 PROCEDURE — 97110 THERAPEUTIC EXERCISES: CPT | Mod: GP,CQ

## 2024-03-18 ASSESSMENT — PAIN - FUNCTIONAL ASSESSMENT: PAIN_FUNCTIONAL_ASSESSMENT: 0-10

## 2024-03-18 ASSESSMENT — PAIN SCALES - GENERAL: PAINLEVEL_OUTOF10: 5 - MODERATE PAIN

## 2024-03-18 NOTE — PROGRESS NOTES
Physical Therapy Treatment    Patient Name: Mekhi Erazo  MRN: [id number]  Today's Date: 3/18/2024  Time Calculation  Start Time: 1000  Stop Time: 1005  Time Calculation (min): 5 min  PT Therapeutic Procedures Time Entry  Manual Therapy Time Entry: 21  Therapeutic Exercise Time Entry: 10         Assessment: Patient identified by name and . Patient presents today with noted limited trunk rotation during ambulation. Patient completed stretches today at home and was able to complete standing R side glides at wall. Patient demonstrated good understanding of HEP. This session focused on adding gentle core strengthening exercises with patient demonstrating good understanding with no increase in pain/ no radiating sx's radiating into buttocks. Patient tolerated manual therapy followed by mechanical lumbar traction with noted decrease in pain following session. Jasmyne DANIEL all treatment and clinical decision making directly supervised by Eli Edge PTA       Plan: Continue with manual therapy, mechanical traction and gentle core strengthening and stretches for reduced pain and improved ease of ADL's. -MT    OP PT Plan  Treatment/Interventions: Aquatic therapy, Cryotherapy, Dry needling, Education/ Instruction, Electrical stimulation, Hot pack, Manual therapy, Mechanical traction, Therapeutic exercises, Ultrasound (Manual Therapy including IASTM and cupping as needed.)  PT Plan: Skilled PT  PT Frequency:  (2x/wk for 5 weeks or 10 sessions)  Rehab Potential: Good  Plan of Care Agreement: Patient (Patient Goal:  Improve pain and improve strength and get back to work)    Current Problem  Problem List Items Addressed This Visit             ICD-10-CM    Sprain of lumbar region, subsequent encounter S33.5XXD       General   General  Reason for Referral: low back issue  Referred By: Tony Hoover DO  General Comment: Visit # 7/10    Subjective  Patient reports a pain level of 5/10 today. Patient states that he did  "a lot of walking over the weekend and his back pain increased since last treatment. Pain decreased to 4/10 post treatment.     Precautions  Precautions  STEADI Fall Risk Score (The score of 4 or more indicates an increased risk of falling): 8  Precautions Comment: No Fall Risk.   PMH:  No significant PMH.  Precautions/Restrictions:  No lifting, pushing or pulling > 10#    Pain  Pain Assessment  Pain Assessment: 0-10  Pain Score: 5 - Moderate pain  Pain Type: Chronic pain  Pain Location: Back  Pain Orientation: Lower    Objective     Treatments:   Ther Ex: 10'  Standing R side glides at wall 2x5   Prone lying x2 min (X)  Prone lying on low elbows x2' (X)  Prone lying on high elbows x2'   Prone press ups, 2x5  Supine Piriformis stretch (pulling knee to opposite shoulder with lumbar spine in neutral 20\" x2 each   Prone lying with alt LE extension x5  Prone lying with alt UE flexion x5  Prone lying with alt UE/LE lifts   BELOW NOT DONE:   Hooklying SKTC  5 sec hold x 5 reps ea (hold) (X)  Hooklying Piriformis/Glut Stretch  20 sec hold x 2 ea (X)  Hooklying Lateral Trunk Rotation  (small comfortable arc of motion)   2 min (hold) (X)     Manual Therapy x21'   STW & dynamic & static cupping to lumbar and mid back in prone    Modalities:  15'   - IFC E-stim with moist heat to low back in prone for 20' total (Prone x15' sitting x5') (X)   -  lumbar traction  Intermittent: 125/75 #, Hold 75\" /rest 20\"   4 static steps to get to 125#     OP EDUCATION:  PT edu pt regarding PT POC/course of treatment and HEP.  Pt was given exercise handout as a reference.  Pt verbalized good understanding.  Access Code:  [email or web address]     Access Code: [email or web address]  URL: [email or web address]  Date: 03/04/2024  Prepared by: Eli Edge  Exercises  - Lying Prone  - 1 x daily - 7 x weekly - 1 sets - 1 reps  - Static Prone on Elbows  - 1 x daily - 7 x weekly - 1 sets - 1 reps  - Prone Press Up  - 1 x daily - 7 x weekly - 1 " sets - 5-10 reps  - Lateral Shift Correction at Wall  - 1 x daily - 7 x weekly - 1 sets - 5-10 reps  3/11/24 Instructed in log roll and reviewed body mechanics to avoid flexion.         Goals:  Active       PT Problem       PT Goal 1       Start:  02/15/24    Expected End:  24       LTG's:    1) Improve Lumbar AROM from  Taylor 75% Ext and 35% flex and b/l SB  to Taylor <= 25% in all planes of deficit in order to facilitate improved gait and mobility.       4-6 weeks      2) Improve Core and  L LE strength from 4/5  to >= 5-/5 throughout in order to facilitate safe gait and mobility.         4-6 weeks      3) Improve Modified Oswestry score by >= 5 points in order to improve QOL.     4-6 weeks      4) Improve LBP from  9/10 to <= 3 /10 with activity in order to improve QOL.      4-6 weeks      5) Pt will be able to walk longer distances, lift/carry objects and perform standing activity in the home and on the job without significant limitation       4-6 weeks      ST)  Pt/caregiver will be I and consistent with HEP in order to maximize strength and flexibility.    2-3 weeks

## 2024-03-18 NOTE — PROGRESS NOTES
Physical Therapy Treatment    Patient Name: Mekhi Erazo  MRN: [id number]  Today's Date: 3/18/2024  Time Calculation  Start Time: 1000  Stop Time: 1005  Time Calculation (min): 5 min  PT Therapeutic Procedures Time Entry  Manual Therapy Time Entry: 21  Therapeutic Exercise Time Entry: 10  PT Modalities Time Entry  Mechanical Traction Time Entry: 15      Assessment: Patient identified by name and . Patient presents today with noted limited trunk rotation during ambulation. Patient completed stretches today at home and was able to complete standing R side glides at wall. Patient demonstrated good understanding of HEP. This session focused on adding gentle core strengthening exercises with patient demonstrating good understanding with no increase in pain/ no radiating sxs into buttocks. Patient tolerated manual therapy followed by mechanical lumbar traction with noted decrease in pain following session. Jasmyne HTOMASA all treatment and clinical decision making directly supervised by Eli Edge PTA       Plan: Continue with manual therapy, mechanical traction and gentle core strengthening and stretches for reduced pain and improved ease of ADL's. -MT    OP PT Plan  Treatment/Interventions: Aquatic therapy, Cryotherapy, Dry needling, Education/ Instruction, Electrical stimulation, Hot pack, Manual therapy, Mechanical traction, Therapeutic exercises, Ultrasound (Manual Therapy including IASTM and cupping as needed.)  PT Plan: Skilled PT  PT Frequency:  (2x/wk for 5 weeks or 10 sessions)  Rehab Potential: Good  Plan of Care Agreement: Patient (Patient Goal:  Improve pain and improve strength and get back to work)    Current Problem  Problem List Items Addressed This Visit             ICD-10-CM    Sprain of lumbar region, subsequent encounter S33.5XXD     General   General  General Comment: Visit # 8/10    Subjective  Patient reports a pain level of 5/10 today. Patient states that he did a lot of walking over  "the weekend and his back pain increased since last treatment. Pain decreased to 4/10 post treatment.     Precautions       Pain       Objective       Treatments:   Ther Ex: 10'  Standing R side glides at wall 2x5   Prone lying x2 min (X)  Prone lying on low elbows x2' (X)  Prone lying on high elbows x2'   Prone press ups, 2x5  Supine Piriformis stretch (pulling knee to opposite shoulder with lumbar spine in neutral 20\" x2 each   Prone UE/LE extension 2 x 10 (N)    BELOW NOT DONE:   Hooklying SKTC  5 sec hold x 5 reps ea (hold) (X)  Hooklying Piriformis/Glut Stretch  20 sec hold x 2 ea (X)  Hooklying Lateral Trunk Rotation  (small comfortable arc of motion)   2 min (hold) (X)     Manual Therapy x21'   STW & dynamic & static cupping to lumbar and mid back in prone    Modalities:  15'   - IFC E-stim with moist heat to low back in prone for 20' total (Prone x15' sitting x5') (X)   -  lumbar traction  Intermittent: 125/75 #, Hold 75\" /rest 20\"   4 static steps to get to 125#          OP EDUCATION:  PT edu pt regarding PT POC/course of treatment and HEP.  Pt was given exercise handout as a reference.  Pt verbalized good understanding.  Access Code:  [email or web address]     Access Code: [email or web address]  URL: [email or web address]  Date: 03/04/2024  Prepared by: Eli Edge  Exercises  - Lying Prone  - 1 x daily - 7 x weekly - 1 sets - 1 reps  - Static Prone on Elbows  - 1 x daily - 7 x weekly - 1 sets - 1 reps  - Prone Press Up  - 1 x daily - 7 x weekly - 1 sets - 5-10 reps  - Lateral Shift Correction at Wall  - 1 x daily - 7 x weekly - 1 sets - 5-10 reps  3/11/24 Instructed in log roll and reviewed body mechanics to avoid flexion.         Goals:  Active       PT Problem       PT Goal 1       Start:  02/15/24    Expected End:  04/12/24       LTG's:    1) Improve Lumbar AROM from  Taylor 75% Ext and 35% flex and b/l SB  to Taylor <= 25% in all planes of deficit in order to facilitate improved gait and mobility.    "    4-6 weeks      2) Improve Core and  L LE strength from 4/5  to >= 5-/5 throughout in order to facilitate safe gait and mobility.         4-6 weeks      3) Improve Modified Oswestry score by >= 5 points in order to improve QOL.     4-6 weeks      4) Improve LBP from  9/10 to <= 3 /10 with activity in order to improve QOL.      4-6 weeks      5) Pt will be able to walk longer distances, lift/carry objects and perform standing activity in the home and on the job without significant limitation       4-6 weeks      ST)  Pt/caregiver will be I and consistent with HEP in order to maximize strength and flexibility.    2-3 weeks

## 2024-03-20 ENCOUNTER — TREATMENT (OUTPATIENT)
Dept: PHYSICAL THERAPY | Facility: CLINIC | Age: 34
End: 2024-03-20
Payer: OTHER MISCELLANEOUS

## 2024-03-20 DIAGNOSIS — S33.5XXD SPRAIN OF LUMBAR REGION, SUBSEQUENT ENCOUNTER: ICD-10-CM

## 2024-03-20 PROCEDURE — 97110 THERAPEUTIC EXERCISES: CPT | Mod: GP | Performed by: PHYSICAL THERAPIST

## 2024-03-20 ASSESSMENT — PAIN - FUNCTIONAL ASSESSMENT: PAIN_FUNCTIONAL_ASSESSMENT: 0-10

## 2024-03-20 ASSESSMENT — PAIN SCALES - GENERAL: PAINLEVEL_OUTOF10: 4

## 2024-03-20 NOTE — PROGRESS NOTES
Physical Therapy Reassessment/Treatment    Patient Name: Mekhi Erazo  MRN: [id number]  Today's Date: 3/20/2024  Time Calculation  Start Time: 1100  Stop Time: 1131  Time Calculation (min): 31 min      PT Therapeutic Procedures Time Entry  Therapeutic Exercise Time Entry: 30                         General  Reason for Referral: low back issue  Referred By: Tony Hoover DO  General Comment: Visit # 9 or 0/8 Needs new C-9    Assessment:   The pt is making good overall progress in PT with improved lumbar/core/L LE strength, ROM/flexibility and pain.  The pt is I with his current HEP and is making progress towards meeting his PT goals.  The pt still has significant LBP and limitation with activity but is improving with PT rx.  Continue PT as per current POC 2x/wk for 4 more weeks or 8 more sessions.  Pt will need a new C-9 for continued PT rx.        Plan:  OP PT Plan  Treatment/Interventions: Aquatic therapy, Cryotherapy, Dry needling, Education/ Instruction, Electrical stimulation, Hot pack, Manual therapy, Mechanical traction, Therapeutic exercises, Ultrasound (Manual Therapy including IASTM and cupping as needed.)  PT Plan: Skilled PT  PT Frequency:  2x/wk for 4 weeks or 8 more sessions  Rehab Potential: Good  Plan of Care Agreement: Patient (Patient Goal:  Improve pain and improve strength and get back to work)    3/20/2024 PT Reassessment:  The pt is making good overall progress in PT with improved lumbar/core/L LE strength, ROM/flexibility and pain.  The pt is I with his current HEP and is making progress towards meeting his PT goals.  The pt still has significant LBP and limitation with activity but is improving with PT rx.  Continue PT as per current POC 2x/wk for 4 more weeks or 8 more sessions.  Pt will need a new C-9 for continued PT rx.      Current Problem  Problem List Items Addressed This Visit             ICD-10-CM       Other    Sprain of lumbar region, subsequent encounter S33.5XXD  "      Subjective  The pt reports that PT rx is definitely helping his lower back pain.  Pt reports that his pain is more centralized and not radiating into his legs nearly as much and the overall LBP is improving.  Pt reports that he would like to continue with PT rx for awhile longer if possible.      Precautions  Precautions  Precautions Comment: No Fall Risk.   PMH:  No significant PMH.  Precautions/Restrictions:  No lifting, pushing or pulling > 10#    Pain  Pain Assessment: 0-10  Pain Score: 4  Pain Type: Chronic pain  Pain Location: Back  Pain Orientation: Lower      Treatments:  Ther Ex: 25'  2 units  Standing R side glides at wall 2x5 x  Prone lying x2 min   Prone lying on low elbows x2'   Prone lying on high elbows x2'   Prone press ups, 2x5  Supine Piriformis stretch (pulling knee to opposite shoulder with lumbar spine in neutral 20\" x2 each  X  Prone UE/LE extension 2 x 10   3/20/2024 PT Reassessment Completed Today.       BELOW NOT DONE:   Hooklying SKTC  5 sec hold x 5 reps ea (hold) (X)  Hooklying Piriformis/Glut Stretch  20 sec hold x 2 ea (X)  Hooklying Lateral Trunk Rotation  (small comfortable arc of motion)   2 min (hold) (X)     Manual Therapy  X today  STW & dynamic & static cupping to lumbar and mid back in prone     Modalities:  X today  - IFC E-stim with moist heat to low back in prone for 20' total (Prone x15' sitting x5') (X)   -  lumbar traction  Intermittent: 125/75 #, Hold 75\" /rest 20\"   4 static steps to get to 125#     OP EDUCATION:   Edu on proper form and speed of movement with ex's.  Pt verbalized/demonstrated good understanding.      Goals:  Active       PT Problem       PT Goal 1       Start:  02/15/24    Expected End:  04/12/24       LTG's:    1) Improve Lumbar AROM from  Taylor 75% Ext and 35% flex and b/l SB  to Taylor <= 25% in all planes of deficit in order to facilitate improved gait and mobility.       4-6 weeks  3/20/2024, PARTIALLY MET, Lumbar Extension Taylor 35% and " flexion Taylor 30% at reassessment    2) Improve Core and  L LE strength from 4/5  to >= 5-/5 throughout in order to facilitate safe gait and mobility.         4-6 weeks  3/20/2024, PARTIALLY MET, L LE and core strength 4+/5 at reassessment    3) Improve Modified Oswestry score by >= 5 points in order to improve QOL.     4-6 weeks  3/20/2024, PARTIALLY MET, pt scored a 38% at baseline and a 34% at Reassessment.    4) Improve LBP from  9/10 to <= 3 /10 with activity in order to improve QOL.      4-6 weeks  3/20/2024, PARTIALLY MET, LPB pain range  5-8/10  and 8/10 is less common compared to baseline.    5) Pt will be able to walk longer distances, lift/carry objects and perform standing activity in the home and on the job without significant limitation       4-6 weeks  3/20/2024, PARTIALLY MET, pt is doing better with the above activity but still has significant LBP and limitation with activity.      ST)  Pt/caregiver will be I and consistent with HEP in order to maximize strength and flexibility.    2-3 weeks  3/20/3034, MET, pt is I with current HEP and has handouts as a reference.

## 2024-03-20 NOTE — Clinical Note
March 20, 2024    Tony Hoover DO    Patient: Mehki Erazo   YOB: 1990   Date of Visit: 3/20/2024       Dear No referring provider defined for this encounter.    The attached plan of care is being sent to you because your patient’s medical reimbursement requires that you certify the plan of care. Your signature is required to allow uninterrupted insurance coverage.      You may indicate your approval by signing below and faxing this form back to us at Dept Fax: [phone].    Please call Dept: [phone] with any questions or concerns.    Thank you for this referral,        Judd Ratliff, PT  [address] [address]    Payer: Payor: [employer] / Plan: [employer] / Product Type: *No Product type* /                                                                         Date:     Dear Judd Ratliff PT,     Re: Mr. Mekhi Erazo, MRN:[id number]    I certify that I have reviewed the attached plan of care and it is medically necessary for Mr. Mekhi Erazo (1990) who is under my care.          ______________________________________                    _________________  Provider name and credentials                                           Date and time                                                                                           Plan of Care 3/20/24   Effective from: 3/20/2024  Effective to: 5/10/2024    Plan ID: [id number]            Participants as of Finalize on 3/20/2024    Name Type Comments Contact Info    Tony Hoover DO Consulting Physician      Judd Ratliff, PT Physical Therapist  [phone]       Last Plan Note     Author: Judd Ratliff PT Status: Signed Last edited: 3/20/2024 11:00 AM       Physical Therapy Treatment    Patient Name: Mekhi Erazo  MRN: [id number]  Today's Date: 3/20/2024  Time Calculation  Start Time: 1100  Stop Time: 1131  Time Calculation (min): 31  min      PT Therapeutic Procedures Time Entry  Therapeutic Exercise Time Entry: 30                         General  Reason for Referral: low back issue  Referred By: Tony Hoover DO  General Comment: Visit # 9 or 0/8 Needs new C-9    Assessment:   The pt is making good overall progress in PT with improved lumbar/core/L LE strength, ROM/flexibility and pain.  The pt is I with his current HEP and is making progress towards meeting his PT goals.  The pt still has significant LBP and limitation with activity but is improving with PT rx.  Continue PT as per current POC 2x/wk for 4 more weeks or 8 more sessions.  Pt will need a new C-9 for continued PT rx.        Plan:  OP PT Plan  Treatment/Interventions: Aquatic therapy, Cryotherapy, Dry needling, Education/ Instruction, Electrical stimulation, Hot pack, Manual therapy, Mechanical traction, Therapeutic exercises, Ultrasound (Manual Therapy including IASTM and cupping as needed.)  PT Plan: Skilled PT  PT Frequency:  2x/wk for 4 weeks or 8 more sessions  Rehab Potential: Good  Plan of Care Agreement: Patient (Patient Goal:  Improve pain and improve strength and get back to work)    3/20/2024 PT Reassessment:  The pt is making good overall progress in PT with improved lumbar/core/L LE strength, ROM/flexibility and pain.  The pt is I with his current HEP and is making progress towards meeting his PT goals.  The pt still has significant LBP and limitation with activity but is improving with PT rx.  Continue PT as per current POC 2x/wk for 4 more weeks or 8 more sessions.  Pt will need a new C-9 for continued PT rx.      Current Problem  Problem List Items Addressed This Visit             ICD-10-CM       Other    Sprain of lumbar region, subsequent encounter S33.5XXD       Subjective  The pt reports that PT rx is definitely helping his lower back pain.  Pt reports that his pain is more centralized and not radiating into his legs nearly as much and the overall LBP is  "improving.  Pt reports that he would like to continue with PT rx for awhile longer if possible.      Precautions  Precautions  Precautions Comment: No Fall Risk.   PMH:  No significant PMH.  Precautions/Restrictions:  No lifting, pushing or pulling > 10#    Pain  Pain Assessment: 0-10  Pain Score: 4  Pain Type: Chronic pain  Pain Location: Back  Pain Orientation: Lower      Treatments:  Ther Ex: 25'  2 units  Standing R side glides at wall 2x5 x  Prone lying x2 min   Prone lying on low elbows x2'   Prone lying on high elbows x2'   Prone press ups, 2x5  Supine Piriformis stretch (pulling knee to opposite shoulder with lumbar spine in neutral 20\" x2 each  X  Prone UE/LE extension 2 x 10   3/20/2024 PT Reassessment Completed Today.       BELOW NOT DONE:   Hooklying SKTC  5 sec hold x 5 reps ea (hold) (X)  Hooklying Piriformis/Glut Stretch  20 sec hold x 2 ea (X)  Hooklying Lateral Trunk Rotation  (small comfortable arc of motion)   2 min (hold) (X)     Manual Therapy  X today  STW & dynamic & static cupping to lumbar and mid back in prone     Modalities:  X today  - IFC E-stim with moist heat to low back in prone for 20' total (Prone x15' sitting x5') (X)   -  lumbar traction  Intermittent: 125/75 #, Hold 75\" /rest 20\"   4 static steps to get to 125#     OP EDUCATION:   Edu on proper form and speed of movement with ex's.  Pt verbalized/demonstrated good understanding.      Goals:  Active       PT Problem       PT Goal 1       Start:  02/15/24    Expected End:  04/12/24       LTG's:    1) Improve Lumbar AROM from  Taylor 75% Ext and 35% flex and b/l SB  to Taylor <= 25% in all planes of deficit in order to facilitate improved gait and mobility.       4-6 weeks  3/20/2024, PARTIALLY MET, Lumbar Extension Taylor 35% and flexion Taylor 30% at reassessment    2) Improve Core and  L LE strength from 4/5  to >= 5-/5 throughout in order to facilitate safe gait and mobility.         4-6 weeks  3/20/2024, PARTIALLY MET, L LE and " core strength 4+/5 at reassessment    3) Improve Modified Oswestry score by >= 5 points in order to improve QOL.     4-6 weeks  3/20/2024, PARTIALLY MET, pt scored a 38% at baseline and a 34% at Reassessment.    4) Improve LBP from  9/10 to <= 3 /10 with activity in order to improve QOL.      4-6 weeks  3/20/2024, PARTIALLY MET, LPB pain range  5-8/10  and 8/10 is less common compared to baseline.    5) Pt will be able to walk longer distances, lift/carry objects and perform standing activity in the home and on the job without significant limitation       4-6 weeks  3/20/2024, PARTIALLY MET, pt is doing better with the above activity but still has significant LBP and limitation with activity.      ST)  Pt/caregiver will be I and consistent with HEP in order to maximize strength and flexibility.    2-3 weeks  3/20/3034, MET, pt is I with current HEP and has handouts as a reference.                    Current Participants as of 3/20/2024    Name Type Comments Contact Info    Tony Hoover DO Consulting Physician      Signature pending    Judd Ratliff, MELANY Physical Therapist  [phone]

## 2024-03-20 NOTE — Clinical Note
March 20, 2024    Tony Hoover DO    Patient: Mekhi Erazo   YOB: 1990   Date of Visit: 3/20/2024       Dear No referring provider defined for this encounter.    The attached plan of care is being sent to you because your patient’s medical reimbursement requires that you certify the plan of care. Your signature is required to allow uninterrupted insurance coverage.      You may indicate your approval by signing below and faxing this form back to us at Dept Fax: [phone].    Please call Dept: [phone] with any questions or concerns.    Thank you for this referral,        Judd Ratliff, PT  [address] [address]    Payer: Payor: [employer] / Plan: [employer] / Product Type: *No Product type* /                                                                         Date:     Dear Judd Ratliff PT,     Re: Mr. Mekhi Erazo, MRN:[id number]    I certify that I have reviewed the attached plan of care and it is medically necessary for Mr. Mekhi Erazo (1990) who is under my care.          ______________________________________                    _________________  Provider name and credentials                                           Date and time                                                                                           Plan of Care 3/20/24   Effective from: 3/20/2024  Effective to: 5/10/2024    Action Required  Plan ID: [id number]           Participants as of 3/20/2024    Name Type Comments Contact Info    Tony Hoover DO Consulting Physician      Judd Ratliff, PT Physical Therapist  [phone]      Last Plan Note     Author: Judd Ratliff PT Status: Addendum Last edited: 3/20/2024 11:00 AM       Physical Therapy Reassessment/Treatment    Patient Name: Mekhi Erazo  MRN: [id number]  Today's Date: 3/20/2024  Time Calculation  Start Time: 1100  Stop Time: 1131  Time  Calculation (min): 31 min      PT Therapeutic Procedures Time Entry  Therapeutic Exercise Time Entry: 30                         General  Reason for Referral: low back issue  Referred By: Tony Hoover DO  General Comment: Visit # 9 or 0/8 Needs new C-9    Assessment:   The pt is making good overall progress in PT with improved lumbar/core/L LE strength, ROM/flexibility and pain.  The pt is I with his current HEP and is making progress towards meeting his PT goals.  The pt still has significant LBP and limitation with activity but is improving with PT rx.  Continue PT as per current POC 2x/wk for 4 more weeks or 8 more sessions.  Pt will need a new C-9 for continued PT rx.        Plan:  OP PT Plan  Treatment/Interventions: Aquatic therapy, Cryotherapy, Dry needling, Education/ Instruction, Electrical stimulation, Hot pack, Manual therapy, Mechanical traction, Therapeutic exercises, Ultrasound (Manual Therapy including IASTM and cupping as needed.)  PT Plan: Skilled PT  PT Frequency:  2x/wk for 4 weeks or 8 more sessions  Rehab Potential: Good  Plan of Care Agreement: Patient (Patient Goal:  Improve pain and improve strength and get back to work)    3/20/2024 PT Reassessment:  The pt is making good overall progress in PT with improved lumbar/core/L LE strength, ROM/flexibility and pain.  The pt is I with his current HEP and is making progress towards meeting his PT goals.  The pt still has significant LBP and limitation with activity but is improving with PT rx.  Continue PT as per current POC 2x/wk for 4 more weeks or 8 more sessions.  Pt will need a new C-9 for continued PT rx.      Current Problem  Problem List Items Addressed This Visit             ICD-10-CM       Other    Sprain of lumbar region, subsequent encounter S33.5XXD       Subjective  The pt reports that PT rx is definitely helping his lower back pain.  Pt reports that his pain is more centralized and not radiating into his legs nearly as much and the  "overall LBP is improving.  Pt reports that he would like to continue with PT rx for awhile longer if possible.      Precautions  Precautions  Precautions Comment: No Fall Risk.   PMH:  No significant PMH.  Precautions/Restrictions:  No lifting, pushing or pulling > 10#    Pain  Pain Assessment: 0-10  Pain Score: 4  Pain Type: Chronic pain  Pain Location: Back  Pain Orientation: Lower      Treatments:  Ther Ex: 25'  2 units  Standing R side glides at wall 2x5 x  Prone lying x2 min   Prone lying on low elbows x2'   Prone lying on high elbows x2'   Prone press ups, 2x5  Supine Piriformis stretch (pulling knee to opposite shoulder with lumbar spine in neutral 20\" x2 each  X  Prone UE/LE extension 2 x 10   3/20/2024 PT Reassessment Completed Today.       BELOW NOT DONE:   Hooklying SKTC  5 sec hold x 5 reps ea (hold) (X)  Hooklying Piriformis/Glut Stretch  20 sec hold x 2 ea (X)  Hooklying Lateral Trunk Rotation  (small comfortable arc of motion)   2 min (hold) (X)     Manual Therapy  X today  STW & dynamic & static cupping to lumbar and mid back in prone     Modalities:  X today  - IFC E-stim with moist heat to low back in prone for 20' total (Prone x15' sitting x5') (X)   -  lumbar traction  Intermittent: 125/75 #, Hold 75\" /rest 20\"   4 static steps to get to 125#     OP EDUCATION:   Edu on proper form and speed of movement with ex's.  Pt verbalized/demonstrated good understanding.      Goals:  Active       PT Problem       PT Goal 1       Start:  02/15/24    Expected End:  04/12/24       LTG's:    1) Improve Lumbar AROM from  Taylor 75% Ext and 35% flex and b/l SB  to Taylor <= 25% in all planes of deficit in order to facilitate improved gait and mobility.       4-6 weeks  3/20/2024, PARTIALLY MET, Lumbar Extension Taylor 35% and flexion Taylor 30% at reassessment    2) Improve Core and  L LE strength from 4/5  to >= 5-/5 throughout in order to facilitate safe gait and mobility.         4-6 weeks  3/20/2024, PARTIALLY " MET, L LE and core strength 4+/5 at reassessment    3) Improve Modified Oswestry score by >= 5 points in order to improve QOL.     4-6 weeks  3/20/2024, PARTIALLY MET, pt scored a 38% at baseline and a 34% at Reassessment.    4) Improve LBP from  9/10 to <= 3 /10 with activity in order to improve QOL.      4-6 weeks  3/20/2024, PARTIALLY MET, LPB pain range  5-8/10  and 8/10 is less common compared to baseline.    5) Pt will be able to walk longer distances, lift/carry objects and perform standing activity in the home and on the job without significant limitation       4-6 weeks  3/20/2024, PARTIALLY MET, pt is doing better with the above activity but still has significant LBP and limitation with activity.      ST)  Pt/caregiver will be I and consistent with HEP in order to maximize strength and flexibility.    2-3 weeks  3/20/3034, MET, pt is I with current HEP and has handouts as a reference.